# Patient Record
Sex: MALE | Race: WHITE | NOT HISPANIC OR LATINO | Employment: OTHER | URBAN - METROPOLITAN AREA
[De-identification: names, ages, dates, MRNs, and addresses within clinical notes are randomized per-mention and may not be internally consistent; named-entity substitution may affect disease eponyms.]

---

## 2017-10-16 ENCOUNTER — TRANSCRIBE ORDERS (OUTPATIENT)
Dept: ADMINISTRATIVE | Facility: HOSPITAL | Age: 47
End: 2017-10-16

## 2017-10-16 DIAGNOSIS — H91.90 HEARING LOSS, UNSPECIFIED HEARING LOSS TYPE, UNSPECIFIED LATERALITY: Primary | ICD-10-CM

## 2018-05-02 LAB
ABSOL LYMPHOCYTES (HISTORICAL): 3.2 K/UL (ref 0.5–4)
ALBUMIN SERPL BCP-MCNC: 4.2 G/DL (ref 3–5.2)
ALP SERPL-CCNC: 65 U/L (ref 43–122)
ALT SERPL W P-5'-P-CCNC: 43 U/L (ref 9–52)
ANION GAP SERPL CALCULATED.3IONS-SCNC: 12 MMOL/L (ref 5–14)
AST SERPL W P-5'-P-CCNC: 18 U/L (ref 17–59)
BASOPHILS # BLD AUTO: 0.1 K/UL (ref 0–0.1)
BASOPHILS # BLD AUTO: 1 % (ref 0–1)
BILIRUB SERPL-MCNC: 0.5 MG/DL
BILIRUB UR QL STRIP: NEGATIVE MG/DL
BUN SERPL-MCNC: 17 MG/DL (ref 5–25)
CALCIUM SERPL-MCNC: 9.2 MG/DL (ref 8.4–10.2)
CHLORIDE SERPL-SCNC: 107 MEQ/L (ref 97–108)
CHOLEST SERPL-MCNC: 160 MG/DL
CHOLEST/HDLC SERPL: 3.6 {RATIO}
CK SERPL-CCNC: 89 U/L (ref 55–170)
CLARITY UR: CLEAR
CO2 SERPL-SCNC: 21 MMOL/L (ref 22–30)
COLOR UR: YELLOW
CREATINE, SERUM (HISTORICAL): 0.76 MG/DL (ref 0.7–1.5)
DEPRECATED RDW RBC AUTO: 13.8 %
EGFR (HISTORICAL): >60 ML/MIN/1.73 M2
EOSINOPHIL # BLD AUTO: 0.1 K/UL (ref 0–0.4)
EOSINOPHIL NFR BLD AUTO: 2 % (ref 0–6)
GLUCOSE SERPL-MCNC: 91 MG/DL (ref 70–99)
GLUCOSE UR STRIP-MCNC: NEGATIVE MG/DL
HCT VFR BLD AUTO: 45.9 % (ref 41–53)
HDLC SERPL-MCNC: 45 MG/DL
HGB BLD-MCNC: 14.7 G/DL (ref 13.5–17.5)
HGB UR QL STRIP.AUTO: NEGATIVE
KETONES UR STRIP-MCNC: NEGATIVE MG/DL
LDL/HDL RATIO (HISTORICAL): 2
LDLC SERPL CALC-MCNC: 91 MG/DL
LEUKOCYTE ESTERASE UR QL STRIP: NEGATIVE
LYMPHOCYTES NFR BLD AUTO: 43 % (ref 25–45)
MCH RBC QN AUTO: 29.7 PG (ref 26–34)
MCHC RBC AUTO-ENTMCNC: 32 % (ref 31–36)
MCV RBC AUTO: 93 FL (ref 80–100)
MONOCYTES # BLD AUTO: 0.4 K/UL (ref 0.2–0.9)
MONOCYTES NFR BLD AUTO: 6 % (ref 1–10)
NEUTROPHILS ABS COUNT (HISTORICAL): 3.6 K/UL (ref 1.8–7.8)
NEUTS SEG NFR BLD AUTO: 48 % (ref 45–65)
NITRITE UR QL STRIP: NEGATIVE
PH UR STRIP.AUTO: 5 [PH] (ref 4.5–8)
PLATELET # BLD AUTO: 262 K/MCL (ref 150–450)
POTASSIUM SERPL-SCNC: 4.2 MEQ/L (ref 3.6–5)
PROT UR STRIP-MCNC: NEGATIVE MG/DL
PSA (HISTORICAL): 0.68 NG/ML
RBC # BLD AUTO: 4.95 M/MCL (ref 4.5–5.9)
SODIUM SERPL-SCNC: 140 MEQ/L (ref 137–147)
SP GR UR STRIP.AUTO: 1.02 (ref 1–1.04)
TOTAL PROTEIN (HISTORICAL): 7 G/DL (ref 5.9–8.4)
TRIGL SERPL-MCNC: 118 MG/DL
TSH SERPL DL<=0.05 MIU/L-ACNC: 0.45 UIU/ML (ref 0.47–4.68)
UROBILINOGEN UR QL STRIP.AUTO: NEGATIVE MG/DL (ref 0–1)
VITAMIN D25-HYDROXY (HISTORICAL): 22.8 NG/ML (ref 30–100)
VLDLC SERPL CALC-MCNC: 24 MG/DL (ref 0–40)
WBC # BLD AUTO: 7.4 K/MCL (ref 4.5–11)

## 2018-09-13 ENCOUNTER — LAB REQUISITION (OUTPATIENT)
Dept: LAB | Facility: HOSPITAL | Age: 48
End: 2018-09-13
Payer: MEDICARE

## 2018-09-13 DIAGNOSIS — E05.40 THYROTOXICOSIS FACTITIA WITHOUT THYROID STORM: ICD-10-CM

## 2018-09-13 LAB — TSH SERPL DL<=0.05 MIU/L-ACNC: 0.51 UIU/ML (ref 0.36–3.74)

## 2018-09-13 PROCEDURE — 84443 ASSAY THYROID STIM HORMONE: CPT | Performed by: FAMILY MEDICINE

## 2018-10-05 PROCEDURE — 88305 TISSUE EXAM BY PATHOLOGIST: CPT | Performed by: PATHOLOGY

## 2018-10-06 ENCOUNTER — LAB REQUISITION (OUTPATIENT)
Dept: LAB | Facility: HOSPITAL | Age: 48
End: 2018-10-06
Payer: MEDICARE

## 2018-10-06 DIAGNOSIS — D22.5 MELANOCYTIC NEVI OF TRUNK: ICD-10-CM

## 2018-11-30 ENCOUNTER — HOSPITAL ENCOUNTER (OUTPATIENT)
Dept: RADIOLOGY | Facility: HOSPITAL | Age: 48
Discharge: HOME | End: 2018-11-30
Payer: MEDICARE

## 2018-11-30 ENCOUNTER — TRANSCRIBE ORDERS (OUTPATIENT)
Dept: ADMINISTRATIVE | Facility: HOSPITAL | Age: 48
End: 2018-11-30

## 2018-11-30 DIAGNOSIS — M54.2 NECK PAIN: Primary | ICD-10-CM

## 2018-11-30 DIAGNOSIS — M25.522 LEFT ELBOW PAIN: ICD-10-CM

## 2018-11-30 DIAGNOSIS — M54.2 NECK PAIN: ICD-10-CM

## 2018-11-30 PROCEDURE — 72050 X-RAY EXAM NECK SPINE 4/5VWS: CPT

## 2018-11-30 PROCEDURE — 73080 X-RAY EXAM OF ELBOW: CPT

## 2019-01-27 ENCOUNTER — OFFICE VISIT (OUTPATIENT)
Dept: URGENT CARE | Age: 49
End: 2019-01-27
Payer: MEDICARE

## 2019-01-27 VITALS
OXYGEN SATURATION: 97 % | HEART RATE: 100 BPM | TEMPERATURE: 99.1 F | DIASTOLIC BLOOD PRESSURE: 74 MMHG | HEIGHT: 68 IN | BODY MASS INDEX: 28.04 KG/M2 | SYSTOLIC BLOOD PRESSURE: 127 MMHG | RESPIRATION RATE: 18 BRPM | WEIGHT: 185 LBS

## 2019-01-27 DIAGNOSIS — J11.1 INFLUENZA: Primary | ICD-10-CM

## 2019-01-27 PROCEDURE — 99213 OFFICE O/P EST LOW 20 MIN: CPT | Performed by: PHYSICIAN ASSISTANT

## 2019-01-27 PROCEDURE — G0463 HOSPITAL OUTPT CLINIC VISIT: HCPCS | Performed by: PHYSICIAN ASSISTANT

## 2019-01-27 RX ORDER — ATORVASTATIN CALCIUM 10 MG/1
10 TABLET, FILM COATED ORAL DAILY
COMMUNITY
End: 2019-12-26 | Stop reason: SDDI

## 2019-01-27 RX ORDER — BROMPHENIRAMINE MALEATE, PSEUDOEPHEDRINE HYDROCHLORIDE, AND DEXTROMETHORPHAN HYDROBROMIDE 2; 30; 10 MG/5ML; MG/5ML; MG/5ML
5 SYRUP ORAL 4 TIMES DAILY PRN
Qty: 120 ML | Refills: 0 | Status: SHIPPED | OUTPATIENT
Start: 2019-01-27

## 2019-01-27 RX ORDER — OSELTAMIVIR PHOSPHATE 75 MG/1
75 CAPSULE ORAL EVERY 12 HOURS SCHEDULED
Qty: 10 CAPSULE | Refills: 0 | Status: SHIPPED | OUTPATIENT
Start: 2019-01-27 | End: 2019-02-01

## 2019-01-27 RX ORDER — ASPIRIN 81 MG/1
81 TABLET, CHEWABLE ORAL DAILY
COMMUNITY

## 2019-01-27 RX ORDER — PREGABALIN 75 MG/1
75 CAPSULE ORAL 2 TIMES DAILY
COMMUNITY
End: 2019-10-04

## 2019-01-27 NOTE — PROGRESS NOTES
[address]        NAME: Sulema Boeck is a 46 y o  male  : 1970    MRN: [id number]  DATE: 2019  TIME: 10:40 AM    Assessment and Plan   Influenza [J11 1]  1  Influenza  oseltamivir (TAMIFLU) 75 mg capsule    brompheniramine-pseudoephedrine-DM 30-2-10 MG/5ML syrup         Patient Instructions       Continue to monitor symptoms  Drink plenty of fluids  Take over-the-counter acetaminophen or ibuprofen for fever control  Follow up with family doctor this week  Go to emergency room if new or worsening symptoms develop  Chief Complaint     Chief Complaint   Patient presents with    Cold Like Symptoms     Patient complains of cough, congestion, body aches and feve since Friday          History of Present Illness       Cough   This is a new problem  Episode onset: 2 days ago  The problem occurs every few minutes  The cough is non-productive  Associated symptoms include a fever, nasal congestion, postnasal drip, rhinorrhea and a sore throat  Pertinent negatives include no chills, ear pain, headaches, hemoptysis, rash, shortness of breath or wheezing  Nothing aggravates the symptoms  Review of Systems   Review of Systems   Constitutional: Positive for fever  Negative for chills, diaphoresis and fatigue  HENT: Positive for postnasal drip, rhinorrhea, sinus pain, sinus pressure and sore throat  Negative for congestion, ear pain, sneezing and trouble swallowing  Eyes: Negative  Respiratory: Positive for cough  Negative for hemoptysis, chest tightness, shortness of breath and wheezing  Cardiovascular: Negative  Gastrointestinal: Negative for abdominal distention, diarrhea, nausea and vomiting  Endocrine: Negative  Genitourinary: Negative for dysuria  Musculoskeletal: Negative  Skin: Negative for rash  Allergic/Immunologic: Negative  Neurological: Negative  Negative for light-headedness and headaches  Hematological: Negative      Psychiatric/Behavioral: Negative  Current Medications       Current Outpatient Prescriptions:     aspirin 81 mg chewable tablet, Chew 81 mg daily, Disp: , Rfl:     atorvastatin (LIPITOR) 10 mg tablet, Take 10 mg by mouth daily, Disp: , Rfl:     pregabalin (LYRICA) 75 mg capsule, Take 75 mg by mouth 2 (two) times a day, Disp: , Rfl:     brompheniramine-pseudoephedrine-DM 30-2-10 MG/5ML syrup, Take 5 mL by mouth 4 (four) times a day as needed for congestion, Disp: 120 mL, Rfl: 0    oseltamivir (TAMIFLU) 75 mg capsule, Take 1 capsule (75 mg total) by mouth every 12 (twelve) hours for 5 days, Disp: 10 capsule, Rfl: 0    Current Allergies     Allergies as of 01/27/2019    (No Known Allergies)            The following portions of the patient's history were reviewed and updated as appropriate: allergies, current medications, past family history, past medical history, past social history, past surgical history and problem list      Past Medical History:   Diagnosis Date    Back disorder     Hyperlipemia        History reviewed  No pertinent surgical history  History reviewed  No pertinent family history  Medications have been verified  Objective   /74 (BP Location: Left arm, Patient Position: Sitting)   Pulse 100   Temp 99 1 °F (37 3 °C)   Resp 18   Ht 5' 8" (1 727 m)   Wt 83 9 kg (185 lb)   SpO2 97%   BMI 28 13 kg/m²        Physical Exam     Physical Exam   Constitutional: He appears well-developed and well-nourished  No distress  HENT:   Head: Normocephalic and atraumatic  Right Ear: External ear normal    Left Ear: External ear normal    Eyes: Conjunctivae are normal  Right eye exhibits no discharge  Left eye exhibits no discharge  Neck: Normal range of motion  Neck supple  Cardiovascular: Normal rate, regular rhythm, normal heart sounds and intact distal pulses  Pulmonary/Chest: Effort normal and breath sounds normal  No respiratory distress  He has no wheezes  He has no rales  Lymphadenopathy:     He has no cervical adenopathy  Skin: Skin is warm  No rash noted  He is not diaphoretic  Nursing note and vitals reviewed

## 2019-01-27 NOTE — PATIENT INSTRUCTIONS
Continue to monitor symptoms  Drink plenty of fluids  Take over-the-counter acetaminophen or ibuprofen for fever control  Follow up with family doctor this week  Go to emergency room if new or worsening symptoms develop  Influenza   WHAT YOU NEED TO KNOW:   Influenza (the flu) is an infection caused by the influenza virus  The flu is easily spread when an infected person coughs, sneezes, or has close contact with others  You may be able to spread the flu to others for 1 week or longer after signs or symptoms appear  DISCHARGE INSTRUCTIONS:   Call 911 for any of the following:   · You have trouble breathing, and your lips look purple or blue  · You have a seizure  Return to the emergency department if:   · You are dizzy, or you are urinating less or not at all  · You have a headache with a stiff neck, and you feel tired or confused  · You have new pain or pressure in your chest     · Your symptoms, such as shortness of breath, vomiting, or diarrhea, get worse  · Your symptoms, such as fever and coughing, seem to get better, but then get worse  Contact your healthcare provider if:   · You have new muscle pain or weakness  · You have questions or concerns about your condition or care  Medicines: You may need any of the following:  · Acetaminophen  decreases pain and fever  It is available without a doctor's order  Ask how much to take and how often to take it  Follow directions  Acetaminophen can cause liver damage if not taken correctly  · NSAIDs , such as ibuprofen, help decrease swelling, pain, and fever  This medicine is available with or without a doctor's order  NSAIDs can cause stomach bleeding or kidney problems in certain people  If you take blood thinner medicine, always ask your healthcare provider if NSAIDs are safe for you  Always read the medicine label and follow directions  · Antivirals  help fight a viral infection  · Take your medicine as directed    Contact your healthcare provider if you think your medicine is not helping or if you have side effects  Tell him or her if you are allergic to any medicine  Keep a list of the medicines, vitamins, and herbs you take  Include the amounts, and when and why you take them  Bring the list or the pill bottles to follow-up visits  Carry your medicine list with you in case of an emergency  Rest  as much as you can to help you recover  Drink liquids as directed  to help prevent dehydration  Ask how much liquid to drink each day and which liquids are best for you  Prevent the spread of influenza:   · Wash your hands often  Use soap and water  Wash your hands after you use the bathroom, change a child's diapers, or sneeze  Wash your hands before you prepare or eat food  Use gel hand cleanser when soap and water are not available  Do not touch your eyes, nose, or mouth unless you have washed your hands first            · Cover your mouth when you sneeze or cough  Cough into a tissue or the bend of your arm  · Clean shared items with a germ-killing   Clean table surfaces, doorknobs, and light switches  Do not share towels, silverware, and dishes with people who are sick  Wash bed sheets, towels, silverware, and dishes with soap and water  · Wear a mask  over your mouth and nose if you are sick or are near anyone who is sick  · Stay away from others  if you are sick  · Influenza vaccine  helps prevent influenza (flu)  Everyone older than 6 months should get a yearly influenza vaccine  Get the vaccine as soon as it is available, usually in September or October each year  Follow up with your healthcare provider as directed:  Write down your questions so you remember to ask them during your visits  © 2017 [employer] Information is for End User's use only and may not be sold, redistributed or otherwise used for commercial purposes   All illustrations and images included in CareNotes® are the copyrighted property of "[employer]  or Alonso Schulte  The above information is an  only  It is not intended as medical advice for individual conditions or treatments  Talk to your doctor, nurse or pharmacist before following any medical regimen to see if it is safe and effective for you

## 2019-01-27 NOTE — LETTER
January 27, 2019     Patient: Ramses Aggarwal   YOB: 1970   Date of Visit: 1/27/2019       To Whom It May Concern: It is my medical opinion that Ramses Aggarwal may return to work on 1/29/2019  If you have any questions or concerns, please don't hesitate to call           Sincerely,        Sonja Fischer PA-C    CC: No Recipients

## 2019-05-28 PROCEDURE — 85025 COMPLETE CBC W/AUTO DIFF WBC: CPT | Performed by: FAMILY MEDICINE

## 2019-05-28 PROCEDURE — G0103 PSA SCREENING: HCPCS | Performed by: FAMILY MEDICINE

## 2019-05-28 PROCEDURE — 84443 ASSAY THYROID STIM HORMONE: CPT | Performed by: FAMILY MEDICINE

## 2019-05-28 PROCEDURE — 82306 VITAMIN D 25 HYDROXY: CPT | Performed by: FAMILY MEDICINE

## 2019-05-28 PROCEDURE — 80053 COMPREHEN METABOLIC PANEL: CPT | Performed by: FAMILY MEDICINE

## 2019-05-28 PROCEDURE — 80061 LIPID PANEL: CPT | Performed by: FAMILY MEDICINE

## 2019-05-29 ENCOUNTER — LAB REQUISITION (OUTPATIENT)
Dept: LAB | Facility: HOSPITAL | Age: 49
End: 2019-05-29
Payer: MEDICARE

## 2019-05-29 DIAGNOSIS — Z12.5 ENCOUNTER FOR SCREENING FOR MALIGNANT NEOPLASM OF PROSTATE: ICD-10-CM

## 2019-05-29 DIAGNOSIS — E05.40 THYROTOXICOSIS FACTITIA WITHOUT THYROID STORM: ICD-10-CM

## 2019-05-29 DIAGNOSIS — E55.9 VITAMIN D DEFICIENCY: ICD-10-CM

## 2019-05-29 DIAGNOSIS — E78.2 MIXED HYPERLIPIDEMIA: ICD-10-CM

## 2019-05-29 LAB
25(OH)D3 SERPL-MCNC: 19.3 NG/ML (ref 30–100)
ALBUMIN SERPL BCP-MCNC: 4 G/DL (ref 3.5–5)
ALP SERPL-CCNC: 73 U/L (ref 46–116)
ALT SERPL W P-5'-P-CCNC: 26 U/L (ref 12–78)
ANION GAP SERPL CALCULATED.3IONS-SCNC: 8 MMOL/L (ref 4–13)
AST SERPL W P-5'-P-CCNC: 9 U/L (ref 5–45)
BASOPHILS # BLD AUTO: 0.04 THOUSANDS/ΜL (ref 0–0.1)
BASOPHILS NFR BLD AUTO: 1 % (ref 0–1)
BILIRUB SERPL-MCNC: 0.58 MG/DL (ref 0.2–1)
BUN SERPL-MCNC: 19 MG/DL (ref 5–25)
CALCIUM SERPL-MCNC: 8.8 MG/DL (ref 8.3–10.1)
CHLORIDE SERPL-SCNC: 106 MMOL/L (ref 100–108)
CHOLEST SERPL-MCNC: 218 MG/DL (ref 50–200)
CO2 SERPL-SCNC: 26 MMOL/L (ref 21–32)
CREAT SERPL-MCNC: 0.96 MG/DL (ref 0.6–1.3)
EOSINOPHIL # BLD AUTO: 0.13 THOUSAND/ΜL (ref 0–0.61)
EOSINOPHIL NFR BLD AUTO: 2 % (ref 0–6)
ERYTHROCYTE [DISTWIDTH] IN BLOOD BY AUTOMATED COUNT: 12.8 % (ref 11.6–15.1)
GFR SERPL CREATININE-BSD FRML MDRD: 91 ML/MIN/1.73SQ M
GLUCOSE SERPL-MCNC: 104 MG/DL (ref 65–140)
HCT VFR BLD AUTO: 48.6 % (ref 36.5–49.3)
HDLC SERPL-MCNC: 56 MG/DL (ref 40–60)
HGB BLD-MCNC: 15.4 G/DL (ref 12–17)
IMM GRANULOCYTES # BLD AUTO: 0.02 THOUSAND/UL (ref 0–0.2)
IMM GRANULOCYTES NFR BLD AUTO: 0 % (ref 0–2)
LDLC SERPL CALC-MCNC: 139 MG/DL (ref 0–100)
LYMPHOCYTES # BLD AUTO: 3.23 THOUSANDS/ΜL (ref 0.6–4.47)
LYMPHOCYTES NFR BLD AUTO: 44 % (ref 14–44)
MCH RBC QN AUTO: 30.4 PG (ref 26.8–34.3)
MCHC RBC AUTO-ENTMCNC: 31.7 G/DL (ref 31.4–37.4)
MCV RBC AUTO: 96 FL (ref 82–98)
MONOCYTES # BLD AUTO: 0.38 THOUSAND/ΜL (ref 0.17–1.22)
MONOCYTES NFR BLD AUTO: 5 % (ref 4–12)
NEUTROPHILS # BLD AUTO: 3.52 THOUSANDS/ΜL (ref 1.85–7.62)
NEUTS SEG NFR BLD AUTO: 48 % (ref 43–75)
NONHDLC SERPL-MCNC: 162 MG/DL
NRBC BLD AUTO-RTO: 0 /100 WBCS
PLATELET # BLD AUTO: 257 THOUSANDS/UL (ref 149–390)
PMV BLD AUTO: 11.1 FL (ref 8.9–12.7)
POTASSIUM SERPL-SCNC: 4.5 MMOL/L (ref 3.5–5.3)
PROT SERPL-MCNC: 7.2 G/DL (ref 6.4–8.2)
PSA SERPL-MCNC: 0.6 NG/ML (ref 0–4)
RBC # BLD AUTO: 5.06 MILLION/UL (ref 3.88–5.62)
SODIUM SERPL-SCNC: 140 MMOL/L (ref 136–145)
TRIGL SERPL-MCNC: 115 MG/DL
TSH SERPL DL<=0.05 MIU/L-ACNC: 0.82 UIU/ML (ref 0.36–3.74)
WBC # BLD AUTO: 7.32 THOUSAND/UL (ref 4.31–10.16)

## 2019-10-04 ENCOUNTER — HOSPITAL ENCOUNTER (EMERGENCY)
Facility: HOSPITAL | Age: 49
Discharge: HOME | End: 2019-10-04
Attending: EMERGENCY MEDICINE | Admitting: EMERGENCY MEDICINE
Payer: MEDICARE

## 2019-10-04 VITALS
RESPIRATION RATE: 16 BRPM | WEIGHT: 180.12 LBS | DIASTOLIC BLOOD PRESSURE: 75 MMHG | SYSTOLIC BLOOD PRESSURE: 142 MMHG | OXYGEN SATURATION: 99 % | TEMPERATURE: 98.4 F | BODY MASS INDEX: 27.3 KG/M2 | HEIGHT: 68 IN | HEART RATE: 74 BPM

## 2019-10-04 DIAGNOSIS — K60.2 ANAL FISSURE: Primary | ICD-10-CM

## 2019-10-04 LAB
BILIRUB UR QL STRIP: NEGATIVE
CLARITY UR: CLEAR
COLOR UR: YELLOW
COLOR, POC: NORMAL
GLUCOSE UR STRIP-MCNC: NEGATIVE MG/DL
HGB UR QL STRIP.AUTO: NEGATIVE
KETONES UR STRIP-MCNC: NEGATIVE MG/DL
LEUKOCYTE ESTERASE UR QL STRIP: NEGATIVE
NITRITE UR QL STRIP: NEGATIVE
PH UR STRIP.AUTO: 7 [PH] (ref 4.5–8)
PROT UR STRIP-MCNC: NEGATIVE MG/DL
SP GR UR STRIP.AUTO: 1.02 (ref 1–1.03)
UROBILINOGEN UR QL STRIP.AUTO: 0.2 E.U./DL

## 2019-10-04 PROCEDURE — 99283 EMERGENCY DEPT VISIT LOW MDM: CPT

## 2019-10-04 PROCEDURE — 81002 URINALYSIS NONAUTO W/O SCOPE: CPT | Performed by: EMERGENCY MEDICINE

## 2019-10-04 PROCEDURE — 99284 EMERGENCY DEPT VISIT MOD MDM: CPT | Performed by: EMERGENCY MEDICINE

## 2019-10-04 PROCEDURE — 81003 URINALYSIS AUTO W/O SCOPE: CPT

## 2019-10-04 RX ORDER — DOCUSATE SODIUM 100 MG/1
100 CAPSULE, LIQUID FILLED ORAL EVERY 12 HOURS
Qty: 30 CAPSULE | Refills: 0 | Status: SHIPPED | OUTPATIENT
Start: 2019-10-04

## 2019-10-04 RX ORDER — IBUPROFEN 600 MG/1
600 TABLET ORAL EVERY 6 HOURS PRN
Qty: 30 TABLET | Refills: 0 | Status: SHIPPED | OUTPATIENT
Start: 2019-10-04

## 2019-10-04 RX ORDER — HYDROCODONE BITARTRATE AND ACETAMINOPHEN 5; 325 MG/1; MG/1
1 TABLET ORAL EVERY 6 HOURS PRN
COMMUNITY
End: 2020-03-12 | Stop reason: SDUPTHER

## 2019-10-04 RX ORDER — ACETAMINOPHEN 500 MG
1000 TABLET ORAL EVERY 6 HOURS PRN
Qty: 30 TABLET | Refills: 0 | Status: SHIPPED | OUTPATIENT
Start: 2019-10-04 | End: 2021-01-19 | Stop reason: ALTCHOICE

## 2019-10-04 NOTE — ED ATTENDING ATTESTATION
10/4/2019  Angela Amor MD, saw and evaluated the patient  I have discussed the patient with the resident/non-physician practitioner and agree with the resident's/non-physician practitioner's findings, Plan of Care, and MDM as documented in the resident's/non-physician practitioner's note, except where noted  All available labs and Radiology studies were reviewed  I was present for key portions of any procedure(s) performed by the resident/non-physician practitioner and I was immediately available to provide assistance  At this point I agree with the current assessment done in the Emergency Department  I have conducted an independent evaluation of this patient a history and physical is as follows:    C/o rectal pain after a hard bm 2 days ago , he had a softer bm today but still had a lot of rectal pain, no blood in the stool    He doesn't notice any hemorrhoids    Well-appearing, no distress, no thrombosed hemorrhoids, possible anal fissure      ED Course             Critical Care Time  Procedures

## 2019-10-04 NOTE — ED PROVIDER NOTES
History  Chief Complaint   Patient presents with    Rectal Pain     pt reports rectal pain that started 1 week ago, increasing in pain  No external hemorrhoid noted, no bleeding  Patient is a 49-year-old male with no significant past medical history presents for evaluation of rectal pain  Symptoms started suddenly 2 days ago during a hard bowel movement  He reports sharp pain in the anal area that is mild at rest but severe with bowel movements  Reports 1 episode of similar pain in the past that resolved spontaneously  Denies associated fever, nausea/vomiting, abdominal pain, rectal bleeding, urinary symptoms  Prior to Admission Medications   Prescriptions Last Dose Informant Patient Reported? Taking? HYDROcodone-acetaminophen (NORCO) 5-325 mg per tablet More than a month at Unknown time Spouse/Significant Other Yes No   Sig: Take 1 tablet by mouth every 6 (six) hours as needed for pain   aspirin 81 mg chewable tablet Past Week at Unknown time  Yes Yes   Sig: Chew 81 mg daily   atorvastatin (LIPITOR) 10 mg tablet Past Week at Unknown time  Yes Yes   Sig: Take 10 mg by mouth daily   brompheniramine-pseudoephedrine-DM 30-2-10 MG/5ML syrup More than a month at Unknown time  No No   Sig: Take 5 mL by mouth 4 (four) times a day as needed for congestion      Facility-Administered Medications: None       Past Medical History:   Diagnosis Date    Back disorder     Hyperlipemia        History reviewed  No pertinent surgical history  History reviewed  No pertinent family history  I have reviewed and agree with the history as documented  Social History     Tobacco Use    Smoking status: Never Smoker    Smokeless tobacco: Never Used   Substance Use Topics    Alcohol use: Yes     Comment: occasional    Drug use: No        Review of Systems   Constitutional: Negative for chills, fatigue and fever  HENT: Negative for congestion and sore throat  Eyes: Negative for visual disturbance  Respiratory: Negative for cough and shortness of breath  Cardiovascular: Negative for chest pain  Gastrointestinal: Positive for constipation and rectal pain  Negative for abdominal pain, anal bleeding, diarrhea, nausea and vomiting  Endocrine: Negative for polyuria  Genitourinary: Negative for difficulty urinating and dysuria  Musculoskeletal: Negative for arthralgias  Skin: Negative for rash  Neurological: Negative for dizziness, light-headedness and headaches  All other systems reviewed and are negative  Physical Exam  ED Triage Vitals [10/04/19 1721]   Temperature Pulse Respirations Blood Pressure SpO2   98 4 °F (36 9 °C) 74 16 142/75 99 %      Temp Source Heart Rate Source Patient Position - Orthostatic VS BP Location FiO2 (%)   Oral Monitor Sitting Right arm --      Pain Score       8             Orthostatic Vital Signs  Vitals:    10/04/19 1721   BP: 142/75   Pulse: 74   Patient Position - Orthostatic VS: Sitting       Physical Exam   Constitutional: He is oriented to person, place, and time  He appears well-developed and well-nourished  No distress  HENT:   Head: Normocephalic and atraumatic  Right Ear: External ear normal    Left Ear: External ear normal    Mouth/Throat: No oropharyngeal exudate  Eyes: Pupils are equal, round, and reactive to light  EOM are normal  No scleral icterus  Neck: Normal range of motion  Neck supple  Cardiovascular: Normal rate, regular rhythm and normal heart sounds  Pulmonary/Chest: Effort normal and breath sounds normal  No respiratory distress  Abdominal: Soft  Bowel sounds are normal  There is no tenderness  There is no rebound and no guarding  Genitourinary: Rectal exam shows tenderness  Rectal exam shows no external hemorrhoid, no internal hemorrhoid and no mass  Musculoskeletal: Normal range of motion  He exhibits no edema  Neurological: He is alert and oriented to person, place, and time  Skin: Skin is warm and dry   No rash noted    Psychiatric: He has a normal mood and affect  Nursing note and vitals reviewed  ED Medications  Medications - No data to display    Diagnostic Studies  Results Reviewed     Procedure Component Value Units Date/Time    POCT urinalysis dipstick [[id number]]  (Normal) Resulted:  10/04/19 1742    Lab Status:  Final result Updated:  10/04/19 1742     Color, UA clear yellow    ED Urine Macroscopic [[id number]] Collected:  10/04/19 1739    Lab Status:  Final result Specimen:  Urine Updated:  10/04/19 1741     Color, UA Yellow     Clarity, UA Clear     pH, UA 7 0     Leukocytes, UA Negative     Nitrite, UA Negative     Protein, UA Negative mg/dl      Glucose, UA Negative mg/dl      Ketones, UA Negative mg/dl      Urobilinogen, UA 0 2 E U /dl      Bilirubin, UA Negative     Blood, UA Negative     Specific Gravity, UA 1 020    Narrative:       CLINITEK RESULT                 No orders to display         Procedures  Procedures        ED Course                               MDM  Number of Diagnoses or Management Options  Anal fissure:   Diagnosis management comments: Patient is a 78-year-old male with no significant past medical history presents for evaluation of rectal pain  Exam shows severe, localized tenderness to the anterior midline anal verge without visible fissure; no palpable hemorrhoid, fluctuance  Clinically suspicious for anal fissure  Will treat with stool softeners, sitz baths and discharge with return precautions  Disposition  Final diagnoses:   Anal fissure     Time reflects when diagnosis was documented in both MDM as applicable and the Disposition within this note     Time User Action Codes Description Comment    10/4/2019  5:57 PM Amol Wilde Add [K60 2] Anal fissure       ED Disposition     ED Disposition Condition Date/Time Comment    Discharge Stable Fri Oct 4, 2019  5:57 PM Christie Walker discharge to home/self care              Follow-up Information     Follow up With Specialties Details Why Contact Info Additional Information    Will Barrett MD Colon and Rectal Surgery Call in 3 days  [address] [facility]  [id number] [place] [address] [address] [facility] Emergency Department Emergency Medicine  As needed, If symptoms worsen [facility] [id number] [phone] [place] [facility], [address]  , [address] [facility], [place], [address]          Discharge Medication List as of 10/4/2019  6:01 PM      START taking these medications    Details   acetaminophen (TYLENOL) 500 mg tablet Take 2 tablets (1,000 mg total) by mouth every 6 (six) hours as needed for mild pain, Starting Fri 10/4/2019, Normal      docusate sodium (COLACE) 100 mg capsule Take 1 capsule (100 mg total) by mouth every 12 (twelve) hours, Starting Fri 10/4/2019, Normal      ibuprofen (MOTRIN) 600 mg tablet Take 1 tablet (600 mg total) by mouth every 6 (six) hours as needed for moderate pain, Starting Fri 10/4/2019, Normal      nitroglycerin (NITRO-BID) 2 % ointment Apply 1 inch topically 2 (two) times a day, Starting Fri 10/4/2019, Normal         CONTINUE these medications which have NOT CHANGED    Details   aspirin 81 mg chewable tablet Chew 81 mg daily, Historical Med      atorvastatin (LIPITOR) 10 mg tablet Take 10 mg by mouth daily, Historical Med      brompheniramine-pseudoephedrine-DM 30-2-10 MG/5ML syrup Take 5 mL by mouth 4 (four) times a day as needed for congestion, Starting Sun 1/27/2019, Normal      HYDROcodone-acetaminophen (NORCO) 5-325 mg per tablet Take 1 tablet by mouth every 6 (six) hours as needed for pain, Historical Med           No discharge procedures on file  ED Provider  Attending physically available and evaluated Chyna Encinas  JONATHAN managed the patient along with the ED Attending      Electronically Signed by         Radha Roberts MD  10/04/19 4501

## 2019-12-26 PROBLEM — E78.2 MIXED HYPERLIPIDEMIA: Status: ACTIVE | Noted: 2019-12-26

## 2020-07-31 ENCOUNTER — HOSPITAL ENCOUNTER (OUTPATIENT)
Dept: NON INVASIVE DIAGNOSTICS | Facility: HOSPITAL | Age: 50
Discharge: HOME | End: 2020-07-31
Payer: MEDICARE

## 2020-07-31 DIAGNOSIS — R07.9 CHEST PAIN, UNSPECIFIED TYPE: ICD-10-CM

## 2020-07-31 PROCEDURE — 93018 CV STRESS TEST I&R ONLY: CPT | Performed by: INTERNAL MEDICINE

## 2020-07-31 PROCEDURE — 93016 CV STRESS TEST SUPVJ ONLY: CPT | Performed by: INTERNAL MEDICINE

## 2020-07-31 PROCEDURE — 93017 CV STRESS TEST TRACING ONLY: CPT

## 2020-08-03 LAB
CHEST PAIN STATEMENT: NORMAL
MAX DIASTOLIC BP: 68 MMHG
MAX HEART RATE: 171 BPM
MAX PREDICTED HEART RATE: 168 BPM
MAX. SYSTOLIC BP: 174 MMHG
PROTOCOL NAME: NORMAL
TARGET HR FORMULA: NORMAL
TEST INDICATION: NORMAL
TIME IN EXERCISE PHASE: NORMAL

## 2021-01-08 PROCEDURE — U0005 INFEC AGEN DETEC AMPLI PROBE: HCPCS | Performed by: FAMILY MEDICINE

## 2021-01-08 PROCEDURE — U0003 INFECTIOUS AGENT DETECTION BY NUCLEIC ACID (DNA OR RNA); SEVERE ACUTE RESPIRATORY SYNDROME CORONAVIRUS 2 (SARS-COV-2) (CORONAVIRUS DISEASE [COVID-19]), AMPLIFIED PROBE TECHNIQUE, MAKING USE OF HIGH THROUGHPUT TECHNOLOGIES AS DESCRIBED BY CMS-2020-01-R: HCPCS | Performed by: FAMILY MEDICINE

## 2021-03-26 DIAGNOSIS — Z20.822 EXPOSURE TO COVID-19 VIRUS: ICD-10-CM

## 2021-03-26 LAB — SARS-COV-2 RNA RESP QL NAA+PROBE: NEGATIVE

## 2021-03-26 PROCEDURE — U0003 INFECTIOUS AGENT DETECTION BY NUCLEIC ACID (DNA OR RNA); SEVERE ACUTE RESPIRATORY SYNDROME CORONAVIRUS 2 (SARS-COV-2) (CORONAVIRUS DISEASE [COVID-19]), AMPLIFIED PROBE TECHNIQUE, MAKING USE OF HIGH THROUGHPUT TECHNOLOGIES AS DESCRIBED BY CMS-2020-01-R: HCPCS | Performed by: FAMILY MEDICINE

## 2021-03-26 PROCEDURE — U0005 INFEC AGEN DETEC AMPLI PROBE: HCPCS | Performed by: FAMILY MEDICINE

## 2021-03-30 DIAGNOSIS — Z23 ENCOUNTER FOR IMMUNIZATION: ICD-10-CM

## 2021-04-08 DIAGNOSIS — B34.9 VIRAL INFECTION, UNSPECIFIED: ICD-10-CM

## 2021-04-08 PROCEDURE — U0003 INFECTIOUS AGENT DETECTION BY NUCLEIC ACID (DNA OR RNA); SEVERE ACUTE RESPIRATORY SYNDROME CORONAVIRUS 2 (SARS-COV-2) (CORONAVIRUS DISEASE [COVID-19]), AMPLIFIED PROBE TECHNIQUE, MAKING USE OF HIGH THROUGHPUT TECHNOLOGIES AS DESCRIBED BY CMS-2020-01-R: HCPCS | Performed by: FAMILY MEDICINE

## 2021-04-08 PROCEDURE — U0005 INFEC AGEN DETEC AMPLI PROBE: HCPCS | Performed by: FAMILY MEDICINE

## 2021-04-09 LAB — SARS-COV-2 RNA RESP QL NAA+PROBE: NEGATIVE

## 2021-07-05 ENCOUNTER — LAB (OUTPATIENT)
Dept: LAB | Facility: HOSPITAL | Age: 51
End: 2021-07-05
Payer: MEDICARE

## 2021-07-05 DIAGNOSIS — Z00.00 ROUTINE ADULT HEALTH MAINTENANCE: ICD-10-CM

## 2021-07-05 DIAGNOSIS — Z12.5 SPECIAL SCREENING FOR MALIGNANT NEOPLASM OF PROSTATE: ICD-10-CM

## 2021-07-05 LAB
25(OH)D3 SERPL-MCNC: 24 NG/ML (ref 30–100)
ALBUMIN SERPL BCP-MCNC: 3.9 G/DL (ref 3–5.2)
ALP SERPL-CCNC: 58 U/L (ref 43–122)
ALT SERPL W P-5'-P-CCNC: 25 U/L
ANION GAP SERPL CALCULATED.3IONS-SCNC: 8 MMOL/L (ref 5–14)
AST SERPL W P-5'-P-CCNC: 23 U/L (ref 17–59)
BASOPHILS # BLD AUTO: 0.1 THOUSANDS/ΜL (ref 0–0.1)
BASOPHILS NFR BLD AUTO: 1 % (ref 0–1)
BILIRUB SERPL-MCNC: 0.74 MG/DL
BUN SERPL-MCNC: 20 MG/DL (ref 5–25)
CALCIUM SERPL-MCNC: 9.2 MG/DL (ref 8.4–10.2)
CHLORIDE SERPL-SCNC: 105 MMOL/L (ref 97–108)
CHOLEST SERPL-MCNC: 176 MG/DL
CO2 SERPL-SCNC: 26 MMOL/L (ref 22–30)
CREAT SERPL-MCNC: 0.81 MG/DL (ref 0.7–1.5)
EOSINOPHIL # BLD AUTO: 0.2 THOUSAND/ΜL (ref 0–0.4)
EOSINOPHIL NFR BLD AUTO: 3 % (ref 0–6)
ERYTHROCYTE [DISTWIDTH] IN BLOOD BY AUTOMATED COUNT: 13.1 %
GFR SERPL CREATININE-BSD FRML MDRD: 101 ML/MIN/1.73SQ M
GLUCOSE P FAST SERPL-MCNC: 102 MG/DL (ref 70–99)
HCT VFR BLD AUTO: 44.5 % (ref 41–53)
HDLC SERPL-MCNC: 43 MG/DL
HGB BLD-MCNC: 15 G/DL (ref 13.5–17.5)
LDLC SERPL CALC-MCNC: 105 MG/DL
LYMPHOCYTES # BLD AUTO: 3.1 THOUSANDS/ΜL (ref 0.5–4)
LYMPHOCYTES NFR BLD AUTO: 46 % (ref 25–45)
MCH RBC QN AUTO: 31.5 PG (ref 26–34)
MCHC RBC AUTO-ENTMCNC: 33.8 G/DL (ref 31–36)
MCV RBC AUTO: 93 FL (ref 80–100)
MONOCYTES # BLD AUTO: 0.4 THOUSAND/ΜL (ref 0.2–0.9)
MONOCYTES NFR BLD AUTO: 6 % (ref 1–10)
NEUTROPHILS # BLD AUTO: 3.1 THOUSANDS/ΜL (ref 1.8–7.8)
NEUTS SEG NFR BLD AUTO: 45 % (ref 45–65)
NONHDLC SERPL-MCNC: 133 MG/DL
PLATELET # BLD AUTO: 246 THOUSANDS/UL (ref 150–450)
PMV BLD AUTO: 9.4 FL (ref 8.9–12.7)
POTASSIUM SERPL-SCNC: 4.7 MMOL/L (ref 3.6–5)
PROT SERPL-MCNC: 7 G/DL (ref 5.9–8.4)
PSA SERPL-MCNC: 0.6 NG/ML (ref 0–4)
RBC # BLD AUTO: 4.77 MILLION/UL (ref 4.5–5.9)
SODIUM SERPL-SCNC: 139 MMOL/L (ref 137–147)
TRIGL SERPL-MCNC: 138 MG/DL
WBC # BLD AUTO: 6.8 THOUSAND/UL (ref 4.5–11)

## 2021-07-05 PROCEDURE — G0103 PSA SCREENING: HCPCS

## 2021-07-05 PROCEDURE — 80053 COMPREHEN METABOLIC PANEL: CPT

## 2021-07-05 PROCEDURE — 85025 COMPLETE CBC W/AUTO DIFF WBC: CPT

## 2021-07-05 PROCEDURE — 80061 LIPID PANEL: CPT

## 2021-07-05 PROCEDURE — 36415 COLL VENOUS BLD VENIPUNCTURE: CPT

## 2021-07-05 PROCEDURE — 82306 VITAMIN D 25 HYDROXY: CPT

## 2021-07-07 NOTE — RESULT ENCOUNTER NOTE
Please call the patient regarding his abnormal result  vitamin d low, vitamin d 2000 units daily #90 and 3 ref

## 2022-01-07 PROCEDURE — U0003 INFECTIOUS AGENT DETECTION BY NUCLEIC ACID (DNA OR RNA); SEVERE ACUTE RESPIRATORY SYNDROME CORONAVIRUS 2 (SARS-COV-2) (CORONAVIRUS DISEASE [COVID-19]), AMPLIFIED PROBE TECHNIQUE, MAKING USE OF HIGH THROUGHPUT TECHNOLOGIES AS DESCRIBED BY CMS-2020-01-R: HCPCS | Performed by: FAMILY MEDICINE

## 2022-11-09 ENCOUNTER — OFFICE VISIT (OUTPATIENT)
Dept: URGENT CARE | Age: 52
End: 2022-11-09

## 2022-11-09 VITALS — BODY MASS INDEX: 25.09 KG/M2 | WEIGHT: 165 LBS | RESPIRATION RATE: 18 BRPM

## 2022-11-09 DIAGNOSIS — S61.411A LACERATION OF RIGHT HAND WITHOUT FOREIGN BODY, INITIAL ENCOUNTER: Primary | ICD-10-CM

## 2022-11-09 NOTE — PROGRESS NOTES
[facility]        NAME: Bert Mckeon is a 45 y o  male  : 1970    MRN: [id number]  DATE: 2022  TIME: 3:19 PM    Assessment and Plan   Laceration of right hand without foreign body, initial encounter [S61 411A]  1  Laceration of right hand without foreign body, initial encounter         11 5-0 nylon sutures utilized to close the wound  Change bandages daily  Does not need to be bandaged , let the wound breathe  Return in 10 days for suture removal   If you start to develop fever, extreme pain or redness in the area, seek treatment  You can get the area wet and gently was with soapy water, pat dry  Patient Instructions       Follow up with PCP in 3-5 days  Proceed to  ER if symptoms worsen  Chief Complaint     Chief Complaint   Patient presents with   • Hand Laceration     Left Hand laceration occurred approx  45min ago  Pt was cleaning the garage when a cable broke and cut through a glove he was wearing  History of Present Illness       Patient states while working in his garage today right before coming in, a cable snapped and cut him across the right top of hand near the thumb, causing a 5 cm laceration  He denies being on blood thinners currently  He denies numbness or tingling to the hand or thumb, and is able to move his digits  Patient states his last tetanus shot was a few months ago  Review of Systems   Review of Systems   Skin: Positive for wound (Approximately 5cm laceration to the top of the right hand near the thumb)  Neurological: Negative for weakness and numbness           Current Medications       Current Outpatient Medications:   •  ALPRAZolam (XANAX) 0 5 mg tablet, Take 1 tablet (0 5 mg total) by mouth daily at bedtime as needed for anxiety, Disp: 90 tablet, Rfl: 3  •  HYDROcodone-acetaminophen (NORCO) 5-325 mg per tablet, Take 1 tablet by mouth every 6 (six) hours as needed for pain Max Daily Amount: 4 tablets, Disp: 28 tablet, Rfl: 0  • aspirin 81 mg chewable tablet, Chew 81 mg daily (Patient not taking: Reported on 11/9/2022), Disp: , Rfl:   •  atorvastatin (LIPITOR) 10 mg tablet, TAKE 1 TABLET (10 MG TOTAL) BY MOUTH DAILY (Patient not taking: Reported on 11/9/2022), Disp: 90 tablet, Rfl: 3  •  brompheniramine-pseudoephedrine-DM 30-2-10 MG/5ML syrup, Take 5 mL by mouth 4 (four) times a day as needed for congestion (Patient not taking: Reported on 11/9/2022), Disp: 120 mL, Rfl: 0  •  docusate sodium (COLACE) 100 mg capsule, Take 1 capsule (100 mg total) by mouth every 12 (twelve) hours (Patient not taking: Reported on 11/9/2022), Disp: 30 capsule, Rfl: 0  •  fluticasone (FLONASE) 50 mcg/act nasal spray, 1 spray into each nostril daily (Patient not taking: Reported on 11/9/2022), Disp: 1 Bottle, Rfl: 5  •  ibuprofen (MOTRIN) 600 mg tablet, Take 1 tablet (600 mg total) by mouth every 6 (six) hours as needed for moderate pain (Patient not taking: Reported on 11/9/2022), Disp: 30 tablet, Rfl: 0  •  loratadine-pseudoephedrine (CLARITIN-D 24-HOUR)  mg per 24 hr tablet, Take 1 tablet by mouth daily (Patient not taking: Reported on 11/9/2022), Disp: 10 tablet, Rfl: 0  •  nitroglycerin (NITRO-BID) 2 % ointment, Apply 1 inch topically 2 (two) times a day (Patient not taking: Reported on 11/9/2022), Disp: 30 g, Rfl: 0    Current Allergies     Allergies as of 11/09/2022   • (No Known Allergies)            The following portions of the patient's history were reviewed and updated as appropriate: allergies, current medications, past family history, past medical history, past social history, past surgical history and problem list      Past Medical History:   Diagnosis Date   • Back disorder    • Hyperlipemia        No past surgical history on file  No family history on file  Medications have been verified          Objective   Resp 18   Wt 74 8 kg (165 lb)   BMI 25 09 kg/m²        Physical Exam     Physical Exam  Musculoskeletal:      Right hand: Laceration present  Normal sensation  Normal capillary refill  Normal pulse  Left hand: Normal       Comments: Able to move thumb normally without any restriction or pain   Skin:     Findings: Laceration (Approximately 5cm laceration to the top of the right hand near the thumb) present  Laceration repair    Date/Time: 11/9/2022 3:07 PM  Performed by: Jim Leal PA-C  Authorized by: Jim Leal PA-C   Consent: Verbal consent obtained  Consent given by: patient  Patient understanding: patient states understanding of the procedure being performed  Patient identity confirmed: verbally with patient  Body area: upper extremity  Location details: right hand  Laceration length: 5 cm  Foreign bodies: no foreign bodies  Tendon involvement: none  Nerve involvement: none  Vascular damage: no  Anesthesia: local infiltration    Anesthesia:  Local Anesthetic: lidocaine 1% without epinephrine    Sedation:  Patient sedated: no      Wound Dehiscence:  Superficial Wound Dehiscence: simple closure      Procedure Details:  Preparation: Patient was prepped and draped in the usual sterile fashion  Irrigation solution: saline  Irrigation method: syringe  Amount of cleaning: standard  Debridement: none  Degree of undermining: none  Skin closure: 5-0 nylon  Number of sutures: 11  Technique: simple  Approximation: close  Approximation difficulty: simple  Dressing: Non adhesive dressing, gauze    Patient tolerance: patient tolerated the procedure well with no immediate complications

## 2022-11-09 NOTE — PATIENT INSTRUCTIONS
Change bandages daily  Does not need to be bandaged 24/7, let the wound breathe  Return in 10 days for suture removal   If you start to develop fever, extreme pain or redness in the area, seek treatment  You can get the area wet and gently was with soapy water, pat dry  Laceration   AMBULATORY CARE:   A laceration  is an injury to the skin and the soft tissue underneath it  Lacerations can happen anywhere on the body  Common symptoms include the following:   Injury or wound to skin and tissue of any shape size that looks like a cut, tear, or gash    Edges of the wound may be close together or wide apart    Pain, bleeding, bruising, or swelling    Numbness around the wound    Decreased movement in an area below the wound    Seek care immediately if:   You have heavy bleeding or bleeding that does not stop after 10 minutes of holding firm, direct pressure over the wound  Your wound opens up  Call your doctor if:   You have a fever or chills  Your laceration is red, warm, or swollen  You have red streaks on your skin coming from your wound  You have white or yellow drainage from the wound that smells bad  You have pain that gets worse, even after treatment  You have questions or concerns about your condition or care  Treatment for a laceration  includes care to stop any bleeding  Your healthcare provider will stop the bleeding by applying pressure to the wound  He or she may need to check your wound for foreign objects and clean it to decrease the chance of infection  Your laceration may be closed with stitches, staples, tissue glue, or medical strips  Ask your healthcare provider if you need a tetanus shot  Medicines: You may need any of the following:  Prescription pain medicine  may be given  Ask your healthcare provider how to take this medicine safely  Some prescription pain medicines contain acetaminophen   Do not take other medicines that contain acetaminophen without talking to your healthcare provider  Too much acetaminophen may cause liver damage  Prescription pain medicine may cause constipation  Ask your healthcare provider how to prevent or treat constipation  Antibiotics  help treat or prevent a bacterial infection  Take your medicine as directed  Contact your healthcare provider if you think your medicine is not helping or if you have side effects  Tell him or her if you are allergic to any medicine  Keep a list of the medicines, vitamins, and herbs you take  Include the amounts, and when and why you take them  Bring the list or the pill bottles to follow-up visits  Carry your medicine list with you in case of an emergency  Care for your wound as directed:   Do not get your wound wet  until your healthcare provider says it is okay  Do not soak your wound in water  Do not go swimming until your healthcare provider says it is okay  Carefully wash the wound with soap and water  Gently pat the area dry or allow it to air dry  Change your bandages  when they get wet, dirty, or after washing  Apply new, clean bandages as directed  Do not apply elastic bandages or tape too tight  Do not put powders or lotions over your incision  Apply antibiotic ointment as directed  Your healthcare provider may give you antibiotic ointment to put over your wound if you have stitches  If you have strips of tape over your incision, let them dry up and fall off on their own  If they do not fall off within 14 days, gently remove them  If you have glue over your wound, do not remove or pick at it  If your glue comes off, do not replace it with glue that you have at home  Check your wound every day for signs of infection, such as swelling, redness, or pus  Self-care:   Apply ice  on your wound for 15 to 20 minutes every hour or as directed  Use an ice pack, or put crushed ice in a plastic bag  Cover it with a towel   Ice helps prevent tissue damage and decreases swelling and pain     Use a splint as directed  A splint will decrease movement and stress on your wound  It may help it heal faster  A splint may be used for lacerations over joints or areas of your body that bend  Ask your healthcare provider how to apply and remove a splint  Decrease scarring of your wound  by applying ointments as directed  Do not apply ointments until your healthcare provider says it is okay  You may need to wait until your wound is healed  Ask which ointment to buy and how often to use it  After your wound is healed, use sunscreen over the area when you are out in the sun  You should do this for at least 6 months to 1 year after your injury  Follow up with your doctor as directed: You will need to return in 3 to 14 days to have stitches or staples removed  Write down your questions so you remember to ask them during your visits  © [employer] 2022 Information is for End User's use only and may not be sold, redistributed or otherwise used for commercial purposes  All illustrations and images included in CareNotes® are the copyrighted property of A D A M , Inc  or Jcarlos Aquino   The above information is an  only  It is not intended as medical advice for individual conditions or treatments  Talk to your doctor, nurse or pharmacist before following any medical regimen to see if it is safe and effective for you

## 2022-11-25 ENCOUNTER — OFFICE VISIT (OUTPATIENT)
Dept: URGENT CARE | Age: 52
End: 2022-11-25

## 2022-11-25 VITALS
OXYGEN SATURATION: 98 % | HEART RATE: 68 BPM | DIASTOLIC BLOOD PRESSURE: 86 MMHG | SYSTOLIC BLOOD PRESSURE: 140 MMHG | TEMPERATURE: 96.1 F | RESPIRATION RATE: 18 BRPM

## 2022-11-25 DIAGNOSIS — Z48.02 ENCOUNTER FOR REMOVAL OF SUTURES: Primary | ICD-10-CM

## 2022-11-25 NOTE — PROGRESS NOTES
[facility]        NAME: Jarrell Byrnes is a 45 y o  male  : 1970    MRN: [id number]  DATE: 2022  TIME: 10:43 AM    Assessment and Plan   Encounter for removal of sutures [Z48 02]  1  Encounter for removal of sutures          Patient presents for removal of sutures that were placed to left hand  Went to PCP on  and placed on Keflex  PCP attempted to remove them however not ready  9 remain  No sign of infection, slight drainage at distal end of laceration  9 sutures removed successfully  Dressed    Patient Instructions       Follow up with PCP as needed    Chief Complaint     Chief Complaint   Patient presents with   • Suture / Staple Removal     Pt presents for suture removal  left hand  Originally had 11 sutures placed, saw PCP 4 days ago and removed two but wound started to bleed and was told to wait a few more days  Started on Keflex  History of Present Illness       Patient presents for removal of sutures that were placed to left hand  Went to PCP on  and placed on Keflex  PCP attempted to remove them however not ready  9 remain  No sign of infection, slight drainage at distal end of laceration  9 sutures removed successfully  Dressed      Review of Systems   Review of Systems   Constitutional: Negative for chills and fever  HENT: Negative for congestion, ear pain, postnasal drip, sinus pain and sore throat  Eyes: Negative for pain and itching  Respiratory: Negative for cough, shortness of breath and wheezing  Cardiovascular: Negative for chest pain and palpitations  Gastrointestinal: Negative for abdominal pain, constipation, diarrhea, nausea and vomiting  Genitourinary: Negative for difficulty urinating and hematuria  Musculoskeletal: Negative for arthralgias and myalgias  Skin: Negative for rash  Neurological: Negative for dizziness, light-headedness and headaches  Psychiatric/Behavioral: Negative for agitation and sleep disturbance  The patient is not nervous/anxious            Current Medications       Current Outpatient Medications:   •  ALPRAZolam (XANAX) 0 5 mg tablet, Take 1 tablet (0 5 mg total) by mouth daily at bedtime as needed for anxiety, Disp: 90 tablet, Rfl: 3  •  cephalexin (KEFLEX) 500 mg capsule, Take 1 capsule (500 mg total) by mouth every 8 (eight) hours for 10 days, Disp: 30 capsule, Rfl: 0  •  aspirin 81 mg chewable tablet, Chew 81 mg daily (Patient not taking: Reported on 11/9/2022), Disp: , Rfl:   •  atorvastatin (LIPITOR) 10 mg tablet, TAKE 1 TABLET (10 MG TOTAL) BY MOUTH DAILY (Patient not taking: Reported on 11/9/2022), Disp: 90 tablet, Rfl: 3  •  brompheniramine-pseudoephedrine-DM 30-2-10 MG/5ML syrup, Take 5 mL by mouth 4 (four) times a day as needed for congestion (Patient not taking: Reported on 11/9/2022), Disp: 120 mL, Rfl: 0  •  docusate sodium (COLACE) 100 mg capsule, Take 1 capsule (100 mg total) by mouth every 12 (twelve) hours (Patient not taking: Reported on 11/9/2022), Disp: 30 capsule, Rfl: 0  •  fluticasone (FLONASE) 50 mcg/act nasal spray, 1 spray into each nostril daily (Patient not taking: Reported on 11/9/2022), Disp: 1 Bottle, Rfl: 5  •  HYDROcodone-acetaminophen (NORCO) 5-325 mg per tablet, Take 1 tablet by mouth every 6 (six) hours as needed for pain Max Daily Amount: 4 tablets (Patient not taking: Reported on 11/25/2022), Disp: 28 tablet, Rfl: 0  •  ibuprofen (MOTRIN) 600 mg tablet, Take 1 tablet (600 mg total) by mouth every 6 (six) hours as needed for moderate pain (Patient not taking: Reported on 11/9/2022), Disp: 30 tablet, Rfl: 0  •  loratadine-pseudoephedrine (CLARITIN-D 24-HOUR)  mg per 24 hr tablet, Take 1 tablet by mouth daily (Patient not taking: Reported on 11/9/2022), Disp: 10 tablet, Rfl: 0  •  mupirocin (BACTROBAN) 2 % ointment, Apply topically 3 (three) times a day (Patient not taking: Reported on 11/25/2022), Disp: 22 g, Rfl: 0  •  nitroglycerin (NITRO-BID) 2 % ointment, Apply 1 inch topically 2 (two) times a day (Patient not taking: Reported on 11/9/2022), Disp: 30 g, Rfl: 0    Current Allergies     Allergies as of 11/25/2022   • (No Known Allergies)            The following portions of the patient's history were reviewed and updated as appropriate: allergies, current medications, past family history, past medical history, past social history, past surgical history and problem list      Past Medical History:   Diagnosis Date   • Back disorder    • Hyperlipemia        No past surgical history on file  No family history on file  Medications have been verified  Objective   /86   Pulse 68   Temp (!) 96 1 °F (35 6 °C) (Tympanic)   Resp 18   SpO2 98%   No LMP for male patient  Physical Exam     Physical Exam  Vitals reviewed  Constitutional:       General: He is not in acute distress  Appearance: Normal appearance  He is not ill-appearing  HENT:      Head: Normocephalic and atraumatic  Eyes:      Extraocular Movements: Extraocular movements intact  Conjunctiva/sclera: Conjunctivae normal    Pulmonary:      Effort: Pulmonary effort is normal    Skin:     General: Skin is warm  Findings: Wound present  Neurological:      General: No focal deficit present  Mental Status: He is alert  Psychiatric:         Mood and Affect: Mood normal          Behavior: Behavior normal          Judgment: Judgment normal          Suture removal    Date/Time: 11/25/2022 10:43 AM  Performed by: GADIEL Ervin  Authorized by: GADIEL Ervin   Universal Protocol:  Consent: Verbal consent obtained  Consent given by: patient  Patient understanding: patient states understanding of the procedure being performed  Patient identity confirmed: verbally with patient        Patient location:  Bedside  Location:     Laterality:  Left    Location:  Upper extremity    Upper extremity location:  Hand  Procedure details:      Tools used:  Suture removal kit    Wound appearance:  No sign(s) of infection, good wound healing, draining and pink  Post-procedure details:     Patient tolerance of procedure:   Tolerated well, no immediate complications

## 2024-05-03 ENCOUNTER — APPOINTMENT (OUTPATIENT)
Dept: RADIOLOGY | Age: 54
End: 2024-05-03
Payer: MEDICARE

## 2024-05-03 ENCOUNTER — APPOINTMENT (OUTPATIENT)
Dept: LAB | Age: 54
End: 2024-05-03
Payer: MEDICARE

## 2024-05-03 DIAGNOSIS — E55.9 VITAMIN D DEFICIENCY: ICD-10-CM

## 2024-05-03 DIAGNOSIS — R05.9 COUGH, UNSPECIFIED TYPE: ICD-10-CM

## 2024-05-03 DIAGNOSIS — F41.9 ANXIETY: ICD-10-CM

## 2024-05-03 DIAGNOSIS — E11.69 TYPE 2 DIABETES MELLITUS WITH OTHER SPECIFIED COMPLICATION, WITHOUT LONG-TERM CURRENT USE OF INSULIN (HCC): ICD-10-CM

## 2024-05-03 DIAGNOSIS — Z12.5 SPECIAL SCREENING FOR MALIGNANT NEOPLASM OF PROSTATE: ICD-10-CM

## 2024-05-03 LAB
25(OH)D3 SERPL-MCNC: 20.9 NG/ML (ref 30–100)
ALBUMIN SERPL BCP-MCNC: 4.3 G/DL (ref 3.5–5)
ALP SERPL-CCNC: 59 U/L (ref 34–104)
ALT SERPL W P-5'-P-CCNC: 14 U/L (ref 7–52)
ANION GAP SERPL CALCULATED.3IONS-SCNC: 10 MMOL/L (ref 4–13)
AST SERPL W P-5'-P-CCNC: 13 U/L (ref 13–39)
BASOPHILS # BLD AUTO: 0.05 THOUSANDS/ÂΜL (ref 0–0.1)
BASOPHILS NFR BLD AUTO: 1 % (ref 0–1)
BILIRUB SERPL-MCNC: 0.65 MG/DL (ref 0.2–1)
BUN SERPL-MCNC: 18 MG/DL (ref 5–25)
CALCIUM SERPL-MCNC: 9.2 MG/DL (ref 8.4–10.2)
CHLORIDE SERPL-SCNC: 103 MMOL/L (ref 96–108)
CHOLEST SERPL-MCNC: 204 MG/DL
CK SERPL-CCNC: 84 U/L (ref 39–308)
CO2 SERPL-SCNC: 27 MMOL/L (ref 21–32)
CREAT SERPL-MCNC: 0.92 MG/DL (ref 0.6–1.3)
EOSINOPHIL # BLD AUTO: 0.19 THOUSAND/ÂΜL (ref 0–0.61)
EOSINOPHIL NFR BLD AUTO: 2 % (ref 0–6)
ERYTHROCYTE [DISTWIDTH] IN BLOOD BY AUTOMATED COUNT: 12.4 % (ref 11.6–15.1)
EST. AVERAGE GLUCOSE BLD GHB EST-MCNC: 108 MG/DL
GFR SERPL CREATININE-BSD FRML MDRD: 92 ML/MIN/1.73SQ M
GLUCOSE P FAST SERPL-MCNC: 91 MG/DL (ref 65–99)
HBA1C MFR BLD: 5.4 %
HCT VFR BLD AUTO: 47.2 % (ref 36.5–49.3)
HDLC SERPL-MCNC: 51 MG/DL
HGB BLD-MCNC: 15.5 G/DL (ref 12–17)
IMM GRANULOCYTES # BLD AUTO: 0.02 THOUSAND/UL (ref 0–0.2)
IMM GRANULOCYTES NFR BLD AUTO: 0 % (ref 0–2)
LDLC SERPL CALC-MCNC: 134 MG/DL (ref 0–100)
LYMPHOCYTES # BLD AUTO: 3.42 THOUSANDS/ÂΜL (ref 0.6–4.47)
LYMPHOCYTES NFR BLD AUTO: 42 % (ref 14–44)
MCH RBC QN AUTO: 30.7 PG (ref 26.8–34.3)
MCHC RBC AUTO-ENTMCNC: 32.8 G/DL (ref 31.4–37.4)
MCV RBC AUTO: 94 FL (ref 82–98)
MONOCYTES # BLD AUTO: 0.55 THOUSAND/ÂΜL (ref 0.17–1.22)
MONOCYTES NFR BLD AUTO: 7 % (ref 4–12)
NEUTROPHILS # BLD AUTO: 3.97 THOUSANDS/ÂΜL (ref 1.85–7.62)
NEUTS SEG NFR BLD AUTO: 48 % (ref 43–75)
NONHDLC SERPL-MCNC: 153 MG/DL
NRBC BLD AUTO-RTO: 0 /100 WBCS
PLATELET # BLD AUTO: 320 THOUSANDS/UL (ref 149–390)
PMV BLD AUTO: 10.6 FL (ref 8.9–12.7)
POTASSIUM SERPL-SCNC: 4.5 MMOL/L (ref 3.5–5.3)
PROT SERPL-MCNC: 7.2 G/DL (ref 6.4–8.4)
PSA SERPL-MCNC: 0.6 NG/ML (ref 0–4)
RBC # BLD AUTO: 5.05 MILLION/UL (ref 3.88–5.62)
SODIUM SERPL-SCNC: 140 MMOL/L (ref 135–147)
TRIGL SERPL-MCNC: 97 MG/DL
TSH SERPL DL<=0.05 MIU/L-ACNC: 0.76 UIU/ML (ref 0.45–4.5)
WBC # BLD AUTO: 8.2 THOUSAND/UL (ref 4.31–10.16)

## 2024-05-03 PROCEDURE — 80053 COMPREHEN METABOLIC PANEL: CPT

## 2024-05-03 PROCEDURE — 84443 ASSAY THYROID STIM HORMONE: CPT

## 2024-05-03 PROCEDURE — 85025 COMPLETE CBC W/AUTO DIFF WBC: CPT

## 2024-05-03 PROCEDURE — 83036 HEMOGLOBIN GLYCOSYLATED A1C: CPT

## 2024-05-03 PROCEDURE — 80061 LIPID PANEL: CPT

## 2024-05-03 PROCEDURE — 71046 X-RAY EXAM CHEST 2 VIEWS: CPT

## 2024-05-03 PROCEDURE — 36415 COLL VENOUS BLD VENIPUNCTURE: CPT

## 2024-05-03 PROCEDURE — 82550 ASSAY OF CK (CPK): CPT

## 2024-05-03 PROCEDURE — G0103 PSA SCREENING: HCPCS

## 2024-05-03 PROCEDURE — 82306 VITAMIN D 25 HYDROXY: CPT

## 2024-05-14 ENCOUNTER — OFFICE VISIT (OUTPATIENT)
Dept: SURGERY | Facility: CLINIC | Age: 54
End: 2024-05-14
Payer: MEDICARE

## 2024-05-14 VITALS
HEIGHT: 68 IN | HEART RATE: 71 BPM | BODY MASS INDEX: 26.07 KG/M2 | DIASTOLIC BLOOD PRESSURE: 72 MMHG | WEIGHT: 172 LBS | TEMPERATURE: 98 F | OXYGEN SATURATION: 97 % | SYSTOLIC BLOOD PRESSURE: 118 MMHG

## 2024-05-14 DIAGNOSIS — E04.2 MULTIPLE THYROID NODULES: Primary | ICD-10-CM

## 2024-05-14 DIAGNOSIS — R13.10 DYSPHAGIA: ICD-10-CM

## 2024-05-14 DIAGNOSIS — E04.1 THYROID NODULE: Primary | ICD-10-CM

## 2024-05-14 PROCEDURE — 99204 OFFICE O/P NEW MOD 45 MIN: CPT | Performed by: SPECIALIST

## 2024-05-14 NOTE — LETTER
May 30, 2024     Jazlyn Escalante MD  [address] [place] [id number]    Patient: Melodie Juan   YOB: 1970   Date of Visit: 5/14/2024       Dear Jazlyn,    Thank you for referring Melodie Juan to me for evaluation. Below are my notes for this consultation.    If you have questions, please do not hesitate to call me. I look forward to following your patient along with you.         Sincerely,    King Cl Arias MD        CC: No Recipients    Cl Arias MD  5/30/2024  5:38 PM  Sign when Signing Visit  Chief Complaint: Thyroid nodules      History of Present Illness: The patient is a 54-year-old Ang[place] male presents the office today with his wife Glory in regards to thyroid nodules.  He was seen in the office of his family physician Dr. Escalante who felt thyroid nodules and performed an ultrasound in his office.  This verified bilateral thyroid nodules and at that time he kindly referred the patient to our office in regards to this.  The patient denies any neck pain.  Does have some dysphagia according to his wife.  No history of radiation to head face or neck.    Patient also has a history of decreased hearing and is actually in the care of an ENT in regards to this.      Past Medical History:   Past Medical History:   Diagnosis Date   • Back disorder    • Hyperlipemia          Past Surgical History:  History reviewed. No pertinent surgical history.      Allergies:  No Known Allergies      Medications:    Current Outpatient Medications:   •  ALPRAZolam (XANAX) 0.5 mg tablet, TAKE 1 TABLET (0.5 MG TOTAL) BY MOUTH DAILY AT BEDTIME AS NEEDED FOR ANXIETY **DARLENE BRAND ONLY**, Disp: 90 tablet, Rfl: 0  •  ibuprofen (MOTRIN) 600 mg tablet, Take 1 tablet (600 mg total) by mouth every 6 (six) hours as needed for moderate pain, Disp: 30 tablet, Rfl: 0  •  aspirin 81 mg chewable tablet, Chew 81 mg daily (Patient not taking: Reported on 11/9/2022), Disp: , Rfl:   •  brompheniramine-pseudoephedrine-DM 30-2-10  MG/5ML syrup, Take 5 mL by mouth 4 (four) times a day as needed for congestion (Patient not taking: Reported on 11/9/2022), Disp: 120 mL, Rfl: 0  •  docusate sodium (COLACE) 100 mg capsule, Take 1 capsule (100 mg total) by mouth every 12 (twelve) hours (Patient not taking: Reported on 11/9/2022), Disp: 30 capsule, Rfl: 0  •  fluticasone (FLONASE) 50 mcg/act nasal spray, 1 spray into each nostril daily (Patient not taking: Reported on 11/9/2022), Disp: 1 Bottle, Rfl: 5  •  HYDROcodone-acetaminophen (NORCO) 5-325 mg per tablet, Take 1 tablet by mouth every 6 (six) hours as needed for pain Max Daily Amount: 4 tablets (Patient not taking: Reported on 5/14/2024), Disp: 28 tablet, Rfl: 0  •  loratadine-pseudoephedrine (CLARITIN-D 24-HOUR)  mg per 24 hr tablet, Take 1 tablet by mouth daily (Patient not taking: Reported on 11/9/2022), Disp: 10 tablet, Rfl: 0  •  mupirocin (BACTROBAN) 2 % ointment, Apply topically 3 (three) times a day (Patient not taking: Reported on 11/25/2022), Disp: 22 g, Rfl: 0  •  nitroglycerin (NITRO-BID) 2 % ointment, Apply 1 inch topically 2 (two) times a day (Patient not taking: Reported on 11/9/2022), Disp: 30 g, Rfl: 0      Social History:  Social History    Social History     Substance and Sexual Activity   Alcohol Use Yes    Comment: occasional     Social History     Substance and Sexual Activity   Drug Use No     Social History     Tobacco Use   Smoking Status Never   Smokeless Tobacco Never         Family History:  History reviewed. No pertinent family history.      Review of Systems:    Decreased hearing, anxiety, polyuria generalized abdominal pain.  No weight loss weight gain fever chills night sweats chest pain nausea vomiting diarrhea constipation    Vitals:  Vitals:    05/14/24 1447   BP: 118/72   Pulse: 71   Temp: 98 °F (36.7 °C)   SpO2: 97%       Physical Exam:  Patient is a middle-aged [place] male who is awake alert no distress.  He is 5 foot 8 inches under 72 pounds.    Vital  signs as above    Throat gag reflex intact.  No abnormalities noted.  Neck subtle bilateral thyroid nodules are noted.  Nontender.  No cervical adenopathy is noted.      Lab Results: I have personally reviewed pertinent reports. See below.  Imaging: I have personally reviewed pertinent reports.   EKG, Pathology, and Other Studies: I have personally reviewed pertinent reports.     No visits with results within 1 Day(s) from this visit.   Latest known visit with results is:   Appointment on 05/03/2024   Component Date Value   • Cholesterol 05/03/2024 204 (H)    • Triglycerides 05/03/2024 97    • HDL, Direct 05/03/2024 51    • LDL Calculated 05/03/2024 134 (H)    • Non-HDL-Chol (CHOL-HDL) 05/03/2024 153    • Sodium 05/03/2024 140    • Potassium 05/03/2024 4.5    • Chloride 05/03/2024 103    • CO2 05/03/2024 27    • ANION GAP 05/03/2024 10    • BUN 05/03/2024 18    • Creatinine 05/03/2024 0.92    • Glucose, Fasting 05/03/2024 91    • Calcium 05/03/2024 9.2    • AST 05/03/2024 13    • ALT 05/03/2024 14    • Alkaline Phosphatase 05/03/2024 59    • Total Protein 05/03/2024 7.2    • Albumin 05/03/2024 4.3    • Total Bilirubin 05/03/2024 0.65    • eGFR 05/03/2024 92    • WBC 05/03/2024 8.20    • RBC 05/03/2024 5.05    • Hemoglobin 05/03/2024 15.5    • Hematocrit 05/03/2024 47.2    • MCV 05/03/2024 94    • MCH 05/03/2024 30.7    • MCHC 05/03/2024 32.8    • RDW 05/03/2024 12.4    • MPV 05/03/2024 10.6    • Platelets 05/03/2024 320    • nRBC 05/03/2024 0    • Segmented % 05/03/2024 48    • Immature Grans % 05/03/2024 0    • Lymphocytes % 05/03/2024 42    • Monocytes % 05/03/2024 7    • Eosinophils Relative 05/03/2024 2    • Basophils Relative 05/03/2024 1    • Absolute Neutrophils 05/03/2024 3.97    • Absolute Immature Grans 05/03/2024 0.02    • Absolute Lymphocytes 05/03/2024 3.42    • Absolute Monocytes 05/03/2024 0.55    • Eosinophils Absolute 05/03/2024 0.19    • Basophils Absolute 05/03/2024 0.05    • Hemoglobin A1C  05/03/2024 5.4    • EAG 05/03/2024 108    • Total CK 05/03/2024 84    • PSA 05/03/2024 0.60    • Vit D, 25-Hydroxy 05/03/2024 20.9 (L)    • TSH 3RD GENERATON 05/03/2024 0.761          Impression:  Bilateral thyroid nodules.  Dysphagia?  The patient needs a formal ultrasound of his thyroid.  Also a laryngoscopy appears to be indicated due to his dysphagia.    Plan:  At this point we will order an ultrasound of his thyroid.  We will also refer him back to ENT in regards to a possible laryngoscopy.

## 2024-05-29 ENCOUNTER — TELEPHONE (OUTPATIENT)
Dept: SURGERY | Facility: CLINIC | Age: 54
End: 2024-05-29

## 2024-05-29 NOTE — TELEPHONE ENCOUNTER
Called patient and left message, please call the office when you rescheduled your US Thyroid.    Thank you, Latrice

## 2024-05-30 NOTE — PROGRESS NOTES
Chief Complaint: Thyroid nodules      History of Present Illness: The patient is a 54-year-old [place]ian male presents the office today with his wife Glory in regards to thyroid nodules.  He was seen in the office of his family physician Dr. Escalante who felt thyroid nodules and performed an ultrasound in his office.  This verified bilateral thyroid nodules and at that time he kindly referred the patient to our office in regards to this.  The patient denies any neck pain.  Does have some dysphagia according to his wife.  No history of radiation to head face or neck.    Patient also has a history of decreased hearing and is actually in the care of an ENT in regards to this.      Past Medical History:   Past Medical History:   Diagnosis Date    Back disorder     Hyperlipemia          Past Surgical History:  History reviewed. No pertinent surgical history.      Allergies:  No Known Allergies      Medications:    Current Outpatient Medications:     ALPRAZolam (XANAX) 0.5 mg tablet, TAKE 1 TABLET (0.5 MG TOTAL) BY MOUTH DAILY AT BEDTIME AS NEEDED FOR ANXIETY **DARLENE BRAND ONLY**, Disp: 90 tablet, Rfl: 0    ibuprofen (MOTRIN) 600 mg tablet, Take 1 tablet (600 mg total) by mouth every 6 (six) hours as needed for moderate pain, Disp: 30 tablet, Rfl: 0    aspirin 81 mg chewable tablet, Chew 81 mg daily (Patient not taking: Reported on 11/9/2022), Disp: , Rfl:     brompheniramine-pseudoephedrine-DM 30-2-10 MG/5ML syrup, Take 5 mL by mouth 4 (four) times a day as needed for congestion (Patient not taking: Reported on 11/9/2022), Disp: 120 mL, Rfl: 0    docusate sodium (COLACE) 100 mg capsule, Take 1 capsule (100 mg total) by mouth every 12 (twelve) hours (Patient not taking: Reported on 11/9/2022), Disp: 30 capsule, Rfl: 0    fluticasone (FLONASE) 50 mcg/act nasal spray, 1 spray into each nostril daily (Patient not taking: Reported on 11/9/2022), Disp: 1 Bottle, Rfl: 5    HYDROcodone-acetaminophen (NORCO) 5-325 mg per  tablet, Take 1 tablet by mouth every 6 (six) hours as needed for pain Max Daily Amount: 4 tablets (Patient not taking: Reported on 5/14/2024), Disp: 28 tablet, Rfl: 0    loratadine-pseudoephedrine (CLARITIN-D 24-HOUR)  mg per 24 hr tablet, Take 1 tablet by mouth daily (Patient not taking: Reported on 11/9/2022), Disp: 10 tablet, Rfl: 0    mupirocin (BACTROBAN) 2 % ointment, Apply topically 3 (three) times a day (Patient not taking: Reported on 11/25/2022), Disp: 22 g, Rfl: 0    nitroglycerin (NITRO-BID) 2 % ointment, Apply 1 inch topically 2 (two) times a day (Patient not taking: Reported on 11/9/2022), Disp: 30 g, Rfl: 0      Social History:  Social History     Social History     Substance and Sexual Activity   Alcohol Use Yes    Comment: occasional     Social History     Substance and Sexual Activity   Drug Use No     Social History     Tobacco Use   Smoking Status Never   Smokeless Tobacco Never         Family History:  History reviewed. No pertinent family history.      Review of Systems:    Decreased hearing, anxiety, polyuria generalized abdominal pain.  No weight loss weight gain fever chills night sweats chest pain nausea vomiting diarrhea constipation    Vitals:  Vitals:    05/14/24 1447   BP: 118/72   Pulse: 71   Temp: 98 °F (36.7 °C)   SpO2: 97%       Physical Exam:  Patient is a middle-aged Armenian male who is awake alert no distress.  He is 5 foot 8 inches under 72 pounds.    Vital signs as above    Throat gag reflex intact.  No abnormalities noted.  Neck subtle bilateral thyroid nodules are noted.  Nontender.  No cervical adenopathy is noted.      Lab Results: I have personally reviewed pertinent reports. See below.  Imaging: I have personally reviewed pertinent reports.   EKG, Pathology, and Other Studies: I have personally reviewed pertinent reports.     No visits with results within 1 Day(s) from this visit.   Latest known visit with results is:   Appointment on 05/03/2024   Component Date  Value    Cholesterol 05/03/2024 204 (H)     Triglycerides 05/03/2024 97     HDL, Direct 05/03/2024 51     LDL Calculated 05/03/2024 134 (H)     Non-HDL-Chol (CHOL-HDL) 05/03/2024 153     Sodium 05/03/2024 140     Potassium 05/03/2024 4.5     Chloride 05/03/2024 103     CO2 05/03/2024 27     ANION GAP 05/03/2024 10     BUN 05/03/2024 18     Creatinine 05/03/2024 0.92     Glucose, Fasting 05/03/2024 91     Calcium 05/03/2024 9.2     AST 05/03/2024 13     ALT 05/03/2024 14     Alkaline Phosphatase 05/03/2024 59     Total Protein 05/03/2024 7.2     Albumin 05/03/2024 4.3     Total Bilirubin 05/03/2024 0.65     eGFR 05/03/2024 92     WBC 05/03/2024 8.20     RBC 05/03/2024 5.05     Hemoglobin 05/03/2024 15.5     Hematocrit 05/03/2024 47.2     MCV 05/03/2024 94     MCH 05/03/2024 30.7     MCHC 05/03/2024 32.8     RDW 05/03/2024 12.4     MPV 05/03/2024 10.6     Platelets 05/03/2024 320     nRBC 05/03/2024 0     Segmented % 05/03/2024 48     Immature Grans % 05/03/2024 0     Lymphocytes % 05/03/2024 42     Monocytes % 05/03/2024 7     Eosinophils Relative 05/03/2024 2     Basophils Relative 05/03/2024 1     Absolute Neutrophils 05/03/2024 3.97     Absolute Immature Grans 05/03/2024 0.02     Absolute Lymphocytes 05/03/2024 3.42     Absolute Monocytes 05/03/2024 0.55     Eosinophils Absolute 05/03/2024 0.19     Basophils Absolute 05/03/2024 0.05     Hemoglobin A1C 05/03/2024 5.4     EAG 05/03/2024 108     Total CK 05/03/2024 84     PSA 05/03/2024 0.60     Vit D, 25-Hydroxy 05/03/2024 20.9 (L)     TSH 3RD GENERATON 05/03/2024 0.761          Impression:  Bilateral thyroid nodules.  Dysphagia?  The patient needs a formal ultrasound of his thyroid.  Also a laryngoscopy appears to be indicated due to his dysphagia.    Plan:  At this point we will order an ultrasound of his thyroid.  We will also refer him back to ENT in regards to a possible laryngoscopy.

## 2024-12-22 ENCOUNTER — APPOINTMENT (OUTPATIENT)
Dept: LAB | Facility: HOSPITAL | Age: 54
End: 2024-12-22
Payer: MEDICARE

## 2024-12-22 DIAGNOSIS — E05.90 HYPERTHYROIDISM: ICD-10-CM

## 2024-12-22 LAB — TSH SERPL DL<=0.05 MIU/L-ACNC: 3.58 UIU/ML (ref 0.45–4.5)

## 2024-12-22 PROCEDURE — 84443 ASSAY THYROID STIM HORMONE: CPT

## 2024-12-22 PROCEDURE — 36415 COLL VENOUS BLD VENIPUNCTURE: CPT
